# Patient Record
Sex: MALE | Race: WHITE
[De-identification: names, ages, dates, MRNs, and addresses within clinical notes are randomized per-mention and may not be internally consistent; named-entity substitution may affect disease eponyms.]

---

## 2019-06-22 ENCOUNTER — HOSPITAL ENCOUNTER (INPATIENT)
Dept: HOSPITAL 95 - ER | Age: 49
LOS: 5 days | Discharge: HOME | DRG: 481 | End: 2019-06-27
Attending: SURGERY | Admitting: SURGERY
Payer: MEDICARE

## 2019-06-22 VITALS — BODY MASS INDEX: 37.93 KG/M2 | WEIGHT: 280.01 LBS | HEIGHT: 72.01 IN

## 2019-06-22 DIAGNOSIS — Z87.891: ICD-10-CM

## 2019-06-22 DIAGNOSIS — R33.9: ICD-10-CM

## 2019-06-22 DIAGNOSIS — F41.9: ICD-10-CM
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
## 2019-06-22 DIAGNOSIS — S22.42XA: ICD-10-CM

## 2019-06-22 DIAGNOSIS — G20: ICD-10-CM

## 2019-06-22 DIAGNOSIS — S72.402A: Primary | ICD-10-CM

## 2019-06-22 DIAGNOSIS — M79.7: ICD-10-CM

## 2019-06-22 DIAGNOSIS — I10: ICD-10-CM

## 2019-06-22 LAB
ALBUMIN SERPL BCP-MCNC: 4.3 G/DL (ref 3.4–5)
ALBUMIN/GLOB SERPL: 1.3 {RATIO} (ref 0.8–1.8)
ALT SERPL W P-5'-P-CCNC: 40 U/L (ref 12–78)
ANION GAP SERPL CALCULATED.4IONS-SCNC: 8 MMOL/L (ref 6–16)
AST SERPL W P-5'-P-CCNC: 52 U/L (ref 12–37)
BASOPHILS # BLD AUTO: 0.02 K/MM3 (ref 0–0.23)
BASOPHILS NFR BLD AUTO: 0 % (ref 0–2)
BILIRUB SERPL-MCNC: 1.1 MG/DL (ref 0.1–1)
BUN SERPL-MCNC: 21 MG/DL (ref 8–24)
CALCIUM SERPL-MCNC: 8.4 MG/DL (ref 8.5–10.1)
CHLORIDE SERPL-SCNC: 108 MMOL/L (ref 98–108)
CO2 SERPL-SCNC: 27 MMOL/L (ref 21–32)
CREAT SERPL-MCNC: 0.71 MG/DL (ref 0.6–1.2)
DEPRECATED RDW RBC AUTO: 45.5 FL (ref 35.1–46.3)
EOSINOPHIL # BLD AUTO: 0 K/MM3 (ref 0–0.68)
EOSINOPHIL NFR BLD AUTO: 0 % (ref 0–6)
ERYTHROCYTE [DISTWIDTH] IN BLOOD BY AUTOMATED COUNT: 13.3 % (ref 11.7–14.2)
ETHANOL SERPL-MCNC: <3 MG/DL
GLOBULIN SER CALC-MCNC: 3.2 G/DL (ref 2.2–4)
GLUCOSE SERPL-MCNC: 113 MG/DL (ref 70–99)
HCT VFR BLD AUTO: 39.6 % (ref 37–53)
HGB BLD-MCNC: 12.7 G/DL (ref 13.5–17.5)
IMM GRANULOCYTES # BLD AUTO: 0.04 K/MM3 (ref 0–0.1)
IMM GRANULOCYTES NFR BLD AUTO: 0 % (ref 0–1)
LYMPHOCYTES # BLD AUTO: 1.18 K/MM3 (ref 0.84–5.2)
LYMPHOCYTES NFR BLD AUTO: 11 % (ref 21–46)
MCHC RBC AUTO-ENTMCNC: 32.1 G/DL (ref 31.5–36.5)
MCV RBC AUTO: 93 FL (ref 80–100)
MONOCYTES # BLD AUTO: 0.75 K/MM3 (ref 0.16–1.47)
MONOCYTES NFR BLD AUTO: 7 % (ref 4–13)
NEUTROPHILS # BLD AUTO: 8.62 K/MM3 (ref 1.96–9.15)
NEUTROPHILS NFR BLD AUTO: 81 % (ref 41–73)
NRBC # BLD AUTO: 0 K/MM3 (ref 0–0.02)
NRBC BLD AUTO-RTO: 0 /100 WBC (ref 0–0.2)
PLATELET # BLD AUTO: 354 K/MM3 (ref 150–400)
POTASSIUM SERPL-SCNC: 3.6 MMOL/L (ref 3.5–5.5)
PROT SERPL-MCNC: 7.5 G/DL (ref 6.4–8.2)
PROTHROMBIN TIME: 10.3 SEC (ref 9.7–11.5)
SODIUM SERPL-SCNC: 143 MMOL/L (ref 136–145)

## 2019-06-22 PROCEDURE — G0480 DRUG TEST DEF 1-7 CLASSES: HCPCS

## 2019-06-22 PROCEDURE — C1713 ANCHOR/SCREW BN/BN,TIS/BN: HCPCS

## 2019-06-23 LAB — OPIATES UR-MCNC: DETECTED NG/ML

## 2019-06-23 PROCEDURE — 0QSC04Z REPOSITION LEFT LOWER FEMUR WITH INTERNAL FIXATION DEVICE, OPEN APPROACH: ICD-10-PCS

## 2019-06-23 NOTE — NUR
SHIFT SUMMARY
PT TO OR AT APROX 1330 FOR ORIF OF R FEMUR FX W/DR RICKETTS. PT HAS NOT
RETURNED FROM PACU AT THIS TIME. PAIN WAS MANAGED PRIOR WITH DILAUDID PCA.
CORREA PATENT, DRAINING DARK YELLOW URINE-PLACED FOR URINARY RETENTION PREVIOUS
SHIFT.

## 2019-06-23 NOTE — NUR
SHIFT SUMMARY
PT A&O X4 T/O SHIFT. ADMITTED FROM ER. VSS; RA. PPPX4; EXT PWD. RLE
ELEVATED/SUPPORTED. REPORTS SOME RLQ ABD PAIN; ABD SOFT; BT X4. PT NPO; IV GTT
PER EMAR. CORREA PLACED D/T RETENTION; PT REPORTS HX OF RETENTION WITH OPIOD
PAIN MEDICATIONS. PAIN MANAGED PER EMAR. SCD TO LLE. CALL LIGHT IN REACH; PT
DEMONSTRATES USE. REPORT GIVEN TO DAY SHIFT RN.

## 2019-06-24 NOTE — NUR
SHIFT SUMMARY:
 
PT POD #1 FOR ORIF OF RIGHT FEMUR. LEG IMMOBILIZER, ACE WRAP AND ICE
PLACED TO RIGHT LEG. PT C/O OF SEVERE PAIN DESPITE BEING GIVEN NORCO 10MG AND
DILAUDID FOR BREAKTHROUGH PAIN. PT RESTARTED ON DILAUDID PCA PER ORDERS. PAIN
IS WELL MANAGED AT THIS TIME. CORREA DRAINING DARK YELLOW URINE. FLUIDS
INFUSING.  EXCELLENT PO INTAKE. DENIES N/V.

## 2019-06-24 NOTE — NUR
SHIFT SUMMARY
PAIN HAS BEEN MANAGED WITH PCA THIS SHIFT.  THIS AFTERNOON EDUCATED PT TO
START WEANING PCA, PT GIVEN OXYCODONE.  PCA BUTTON OUT OF PT'S REACH TO HELP
WEAN PCA.  PT EDUCATED TO NOTIFY SAFF IF HE HAS PAIN.  PT WORKED WITH THERAPY
AND IS A 2 ASSIST WITH GAIT BELT AND WALKER.  VSS.  WILL MONITOR UNTIL REPORT
TO ONCOMING RN.

## 2019-06-25 NOTE — NUR
SUMMARY: POD 2 ORIF RIGHT FEMUR FX BY DR. RICKETTS WITH DR. PALMA ADMITTING
FOR TRAUMA. VSS, AFEBRILE, ROOM AIR, TOLERATING REGULAR DIET WELL. PT PAIN
WELL CONTROLLED WITH 10MG OXYCODONE X2 THIS SHIFT AND 4 DOSES PCA FOR
BREAKTHROUGH AFTER POSITIONING SELF BACK TO BED. PT MOVES WELL NWB RLE WITH
GB AND FWW ASSIST AND 2 STAFF MEMBERS. IMMOBILIZER TO RLE, ICE PACK APPLIED TO
RLE. CIRCULATION AND SENSATION WNL, WIGGLES TOES. ANTICIPATE PT/OT AND
CONTINUED DC PLANNING THIS DAY.

## 2019-06-25 NOTE — NUR
SHIFT SUMMARY: PT IS POD2 R FEMUR REPAIR. NO ACUTE CHANGE TODAY, PT HAS DONE
WELL, VSS, A/O. MEDICATED Q4 WITH OXYCODONE, PCA DC'D. PT REPORTS PAIN WELL
MANAGED..  JENNIFER FUNEZ SAW PT AND CHANGED DRESSING, SITE CONTINUES TO BE CDI AND
IN IMMOBILZER.  PT DOING WELL FOLLOWING NON WEIGHT BEAR ORDER OF R LEG, UP
WITH 1-2 ASSIST, FWW AND GAIT BELT. CORREA DC'D AND PT VOIDING. AUDELIA DROPPED
OFF PT WHEELCHAIR FOR DISCHARGE. PLAN IS HOME WITH HOME HEALTH.. NO SAFETY
CONCERNS AT THIS TIME.

## 2019-06-26 NOTE — NUR
SUMMARY: PT IS POD3 R FEMUR ORIF. NO CHANGE TODAY, VSS, A/O. MEDICATED WITH
10MG OXYCODONE Q4. PT DOING WELL WITH PYSICAL THERAPY, 1 ASSIST, FWW AND GAIT
BELT.  IMMOBILIZER IN PLACE AND DRESSING HAS BEEN CDI, PT FOLLOWING NWB
STATUS. PLAN IS HOME WITH HOME HEALTH TOMORROW.

## 2019-06-27 NOTE — NUR
following accompanying pt to the bathroom with fww and gait belt, pt given
instructions to pull call cord in order to have assistance back to bed. pt
transferred himself back to bed without calling. this rn educated pt of safety
protocols and requested pt call for transfer assistance

## 2019-06-27 NOTE — NUR
SUMMARY
POD #4
NO ACUTE CHANGES NOTED THROUGH THE NIGHT. CIRC REMAINS WNL. DRSG &
IMMOBILIZER REMAIN C.D.I. PAIN MANAGED PER EMAR. 1 ASSIST USING FWW/GAIT BELT.
BOWEL CARE PROVIDED, PT HAD A FORMED XL BM. CALL LIGHT IN REACH

## 2019-06-27 NOTE — NUR
provided pt with discharge instructions, peripheral IV removed WNL, pt states
understanding of instructions, written prescrption provided, medical records
(operative report, H/P, report of imaging records) faxes to [facility] for pt's followup visit, disc of all imaging studies in pt's
discharge packets. pt awaiting his friend who will be driving him home. pt
will be escorted to awaiting vehicle via his own wheelchair